# Patient Record
Sex: MALE | Race: WHITE | NOT HISPANIC OR LATINO | Employment: FULL TIME | URBAN - METROPOLITAN AREA
[De-identification: names, ages, dates, MRNs, and addresses within clinical notes are randomized per-mention and may not be internally consistent; named-entity substitution may affect disease eponyms.]

---

## 2022-07-15 ENCOUNTER — OFFICE VISIT (OUTPATIENT)
Dept: FAMILY MEDICINE CLINIC | Facility: CLINIC | Age: 57
End: 2022-07-15
Payer: COMMERCIAL

## 2022-07-15 VITALS
BODY MASS INDEX: 32.51 KG/M2 | DIASTOLIC BLOOD PRESSURE: 84 MMHG | SYSTOLIC BLOOD PRESSURE: 122 MMHG | OXYGEN SATURATION: 98 % | HEART RATE: 108 BPM | HEIGHT: 72 IN | WEIGHT: 240 LBS | TEMPERATURE: 99.4 F

## 2022-07-15 DIAGNOSIS — Z12.11 SCREENING FOR COLORECTAL CANCER: ICD-10-CM

## 2022-07-15 DIAGNOSIS — Z23 ENCOUNTER FOR IMMUNIZATION: ICD-10-CM

## 2022-07-15 DIAGNOSIS — M25.551 RIGHT HIP PAIN: ICD-10-CM

## 2022-07-15 DIAGNOSIS — Z12.5 SCREENING FOR PROSTATE CANCER: ICD-10-CM

## 2022-07-15 DIAGNOSIS — Z12.12 SCREENING FOR COLORECTAL CANCER: ICD-10-CM

## 2022-07-15 DIAGNOSIS — Z11.59 NEED FOR HEPATITIS C SCREENING TEST: ICD-10-CM

## 2022-07-15 DIAGNOSIS — Z11.4 SCREENING FOR HIV (HUMAN IMMUNODEFICIENCY VIRUS): ICD-10-CM

## 2022-07-15 DIAGNOSIS — E78.2 MIXED HYPERLIPIDEMIA: ICD-10-CM

## 2022-07-15 DIAGNOSIS — I83.813 VARICOSE VEINS OF BOTH LOWER EXTREMITIES WITH PAIN: ICD-10-CM

## 2022-07-15 DIAGNOSIS — Z00.00 ANNUAL PHYSICAL EXAM: Primary | ICD-10-CM

## 2022-07-15 PROBLEM — R03.0 ELEVATED BP WITHOUT DIAGNOSIS OF HYPERTENSION: Status: RESOLVED | Noted: 2021-04-06 | Resolved: 2022-07-15

## 2022-07-15 PROBLEM — R03.0 ELEVATED BP WITHOUT DIAGNOSIS OF HYPERTENSION: Status: ACTIVE | Noted: 2021-04-06

## 2022-07-15 PROBLEM — I83.93 ASYMPTOMATIC VARICOSE VEINS OF BOTH LOWER EXTREMITIES: Status: ACTIVE | Noted: 2021-04-06

## 2022-07-15 PROBLEM — L30.9 CHRONIC ECZEMA OF FOOT: Status: ACTIVE | Noted: 2021-04-06

## 2022-07-15 PROCEDURE — 3725F SCREEN DEPRESSION PERFORMED: CPT | Performed by: FAMILY MEDICINE

## 2022-07-15 PROCEDURE — 90715 TDAP VACCINE 7 YRS/> IM: CPT

## 2022-07-15 PROCEDURE — 99386 PREV VISIT NEW AGE 40-64: CPT | Performed by: FAMILY MEDICINE

## 2022-07-15 PROCEDURE — 90471 IMMUNIZATION ADMIN: CPT

## 2022-07-15 NOTE — PROGRESS NOTES
ADULT ANNUAL [facility]    NAME: Blu Faulkner  AGE: 55 y o  SEX: male  : 1965     DATE: 7/15/2022     Assessment and Plan:     Problem List Items Addressed This Visit        Cardiovascular and Mediastinum    Varicose veins of both lower extremities with pain  No phlebitis or associated swelling  For pain associated with his varicose veins recommend compression stockings consistently during waking hours  If no improvement with this, advised him to call and I will place referral to vascular surgeon  Other    Mixed hyperlipidemia    Relevant Orders    Lipid panel    Comprehensive metabolic panel  Reviewed labs done by previous PCP in May of last year  His total cholesterol was elevated at 211  LDL was 137 and HDL was 58  Will repeat lipid panel with his labs and call him with results  Other Visit Diagnoses     Annual physical exam    -  Primary  Discussed diet and exercise  Immunization History   Administered Date(s) Administered    Tdap 07/15/2022     It has bene 10 years since his last tetanus vaccine  adacel ordered today  He has never had colon cancer screen  Is agreeable to colonoscopy and referral placed today  Sees dentist    Screening labs ordered  He is agreeable to Hep C and HIV  Discussed USPSTF recommendation for prostate cancer screen and discussed that not all insurance companies cover  Patient would like to have PSA done         Screening for colorectal cancer        Relevant Orders    Ambulatory referral for Colonoscopy    Screening for prostate cancer        Relevant Orders    PSA, Total Screen    Comprehensive metabolic panel    BMI 81 9-86 2,FEMVV        Encounter for immunization        Relevant Orders    TDAP VACCINE GREATER THAN OR EQUAL TO 8YO IM    Need for hepatitis C screening test        Relevant Orders    Hepatitis C Antibody ([facility])    Screening for HIV (human immunodeficiency virus)        Relevant Orders    HIV 1/2 Antigen/Antibody (4th Generation) w Reflex [facility]    Right hip pain        Relevant Orders    XR hip/pelv 2-3 vws right if performed  Check xray  Will call patient with results  F/u pending xray report  His range of motion is significantly limited and would benefit from PT           Immunizations and preventive care screenings were discussed with patient today  Appropriate education was printed on patient's after visit summary  Counseling:  Alcohol/drug use: discussed moderation in alcohol intake, the recommendations for healthy alcohol use, and avoidance of illicit drug use  Dental Health: discussed importance of regular tooth brushing, flossing, and dental visits  Sexual health: discussed sexually transmitted diseases, partner selection, use of condoms, avoidance of unintended pregnancy, and contraceptive alternatives  Exercise: the importance of regular exercise/physical activity was discussed  Recommend exercise 3-5 times per week for at least 30 minutes  No follow-ups on file  Chief Complaint:     Chief Complaint   Patient presents with   [id number]     Pt is in the office to establish care  Pt's stated concerns include intermittent aches and pains throughout the body  Pt also stated that he has right hip pain  Pt has a hx of varicose veins  Pt is having some degree of pain in his legs (pain level is an 8 ) Pt states that the leg pain is most severe at night  Pt notes swelling in his right ankle  Pt has noticed an increase in leg discomfort over the last two months  History of Present Illness:     Adult Annual Physical   Patient here for a comprehensive physical exam  The patient reports problems - right hip pain, varicose veins        He was previously a patient of [facility]    [address] [place] [address]   [phone]     He was there for visit in April of 2021 and had some labs done through their office  His lipid panel showed elevated cholesterol  bp was elevated at time of his last visit there  He denies any headache, dizziness, chest pain, palpitations or shortness of breath  He has know varicose veins bilateral lower extremities  Really have not bothered him until recently  Now having pain in both legs for last couple of months  Ankles occasionally swell, but no redness, firmness or tenderness over the prominent veins  Has compression stockings at home  Notes that when he was consistently wearing them in the past, it helped  He does a lot of sitting as he is a  and works nights  He is also having some right hip pain  Ongoing for months  He denies any injury  Pain is located both anteriorly in hip (points to groin) and posteriorly  Pain does not radiate and he has no low back pain  Pain is aggravated by walking  The hip pain is starting to affect his gait  Diet and Physical Activity  Diet/Nutrition: admits to snacking at night when he drives  otherwise tries to follow fairly healthy diet      Exercise: no formal exercise  He is active at work  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 and since doing that no longer goes to the gym  Used to go to [employer] [facility] and did cardio and some weight training with a   Depression Screening  PHQ-2/9 Depression Screening    Little interest or pleasure in doing things: 1 - several days  Feeling down, depressed, or hopeless: 0 - not at all  PHQ-2 Score: 1  PHQ-2 Interpretation: Negative depression screen       General Health  Sleep: sleeps 5 hours      Hearing: normal - bilateral   Vision: wears glasses  Just wears readers  Dental: regular dental visits          Health  Symptoms include: none     Review of Systems:     Review of Systems   Past Medical History:     Past Medical History:   Diagnosis Date    Asymptomatic varicose veins of bilateral lower extremities     Varicose veins of legs 2019    bilateral      Past Surgical History:     Past Surgical History:   Procedure Laterality Date    MULTIPLE TOOTH EXTRACTIONS Bilateral 2008      Family History:     Family History   Problem Relation Age of Onset    Aneurysm Mother     Heart disease Father 76        Had 11 coronary bypasses    Stroke Father     Kidney disease Paternal Grandfather 76        Had dialysis also      Social History:     Social History     Socioeconomic History    Marital status: /Civil Union     Spouse name: None    Number of children: None    Years of education: None    Highest education level: None   Occupational History    None   Tobacco Use    Smoking status: Never Smoker    Smokeless tobacco: Never Used   Vaping Use    Vaping Use: Never used   Substance and Sexual Activity    Alcohol use: Yes     Alcohol/week: 1 0 standard drink     Types: 1 Glasses of wine per week     Comment: Less than one glass of wine per week; sometimes pt goes weeks without drinking   Drug use: Never    Sexual activity: Yes     Partners: Female   Other Topics Concern    None   Social History Narrative    Was in [employer] for 4 years ([id number])    Currently works nights driving truck  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        One child     Social Determinants of Health     Financial Resource Strain: Not on file   Food Insecurity: Not on file   Transportation Needs: Not on file   Physical Activity: Not on file   Stress: Not on file   Social Connections: Not on file   Intimate Partner Violence: Not on file   Housing Stability: Not on file      Current Medications:     No current outpatient medications on file  No current facility-administered medications for this visit        Allergies:     No Known Allergies   Physical Exam:     /84 (BP Location: Left arm, Patient Position: Sitting, Cuff Size: Large)   Pulse (!) 108   Temp 99 4 °F (37 4 °C) (Tympanic)   Ht 5' 11 5" (1 816 m)   Wt 109 kg (240 lb)   SpO2 98%   BMI 33 01 kg/m²     Physical Exam  Vitals and nursing note reviewed  Constitutional:       General: He is not in acute distress  Appearance: Normal appearance  He is obese  He is not ill-appearing, toxic-appearing or diaphoretic  Comments: Antalgic gait   HENT:      Head: Normocephalic and atraumatic  Right Ear: Tympanic membrane normal       Left Ear: Tympanic membrane normal       Nose: Nose normal       Mouth/Throat:      Mouth: Mucous membranes are moist       Pharynx: No oropharyngeal exudate or posterior oropharyngeal erythema  Eyes:      Extraocular Movements: Extraocular movements intact  Conjunctiva/sclera: Conjunctivae normal       Pupils: Pupils are equal, round, and reactive to light  Neck:      Comments: No thyromegaly  Cardiovascular:      Rate and Rhythm: Normal rate and regular rhythm  Pulses: Normal pulses  Heart sounds: No murmur heard  Comments: + varicosities bilateral lower legs  Pulmonary:      Effort: Pulmonary effort is normal       Breath sounds: Normal breath sounds  No wheezing  Abdominal:      General: Abdomen is flat  Bowel sounds are normal  There is no distension  Palpations: Abdomen is soft  There is no mass  Tenderness: There is no abdominal tenderness  Musculoskeletal:      Cervical back: Normal range of motion and neck supple  Right lower leg: No edema  Left lower leg: No edema  Comments: Right hip with no tenderness on palpation  Active flexion to 45 degrees  Passive flexion to 50 degrees  Abduction to 40 degrees  Decreased external and internal rotation   Lymphadenopathy:      Cervical: No cervical adenopathy  Neurological:      General: No focal deficit present  Mental Status: He is alert and oriented to person, place, and time  Psychiatric:         Mood and Affect: Mood normal           Josh Duran DO  [facility]  BMI Counseling:  Body mass index is 33 01 kg/m²  The BMI is above normal  Nutrition recommendations include reducing portion sizes and consuming healthier snacks  Exercise recommendations include exercising 3-5 times per week

## 2022-07-15 NOTE — PATIENT INSTRUCTIONS
Wellness Visit for Adults   AMBULATORY CARE:   A wellness visit  is when you see your healthcare provider to get screened for health problems  Your healthcare provider will also give you advice on how to stay healthy  Write down your questions so you remember to ask them  Ask your healthcare provider how often you should have a wellness visit  What happens at a wellness visit:  Your healthcare provider will ask about your health, and your family history of health problems  This includes high blood pressure, heart disease, and cancer  He or she will ask if you have symptoms that concern you, if you smoke, and about your mood  You may also be asked about your intake of medicines, supplements, food, and alcohol  Any of the following may be done:  · Your weight  will be checked  Your height may also be checked so your body mass index (BMI) can be calculated  Your BMI shows if you are at a healthy weight  · Your blood pressure  and heart rate will be checked  Your temperature may also be checked  · Blood and urine tests  may be done  Blood tests may be done to check your cholesterol levels  Abnormal cholesterol levels increase your risk for heart disease and stroke  You may also need a blood or urine test to check for diabetes if you are at increased risk  Urine tests may be done to look for signs of an infection or kidney disease  · A physical exam  includes checking your heartbeat and lungs with a stethoscope  Your healthcare provider may also check your skin to look for sun damage  · Screening tests  may be recommended  A screening test is done to check for diseases that may not cause symptoms  The screening tests you may need depend on your age, gender, family history, and lifestyle habits  For example, colorectal screening may be recommended if you are 48years old or older  Screening tests you need if you are a woman:   · A Pap smear  is used to screen for cervical cancer   Pap smears are usually done every 3 to 5 years depending on your age  You may need them more often if you have had abnormal Pap smear test results in the past  Ask your healthcare provider how often you should have a Pap smear  · A mammogram  is an x-ray of your breasts to screen for breast cancer  Experts recommend mammograms every 2 years starting at age 48 years  You may need a mammogram at age 52 years or younger if you have an increased risk for breast cancer  Talk to your healthcare provider about when you should start having mammograms and how often you need them  Vaccines you may need:   · Get an influenza vaccine  every year  The influenza vaccine protects you from the flu  Several types of viruses cause the flu  The viruses change over time, so new vaccines are made each year  · Get a tetanus-diphtheria (Td) booster vaccine  every 10 years  This vaccine protects you against tetanus and diphtheria  Tetanus is a severe infection that may cause painful muscle spasms and lockjaw  Diphtheria is a severe bacterial infection that causes a thick covering in the back of your mouth and throat  · Get a human papillomavirus (HPV) vaccine  if you are female and aged 23 to 32 or male 23 to 24 and never received it  This vaccine protects you from HPV infection  HPV is the most common infection spread by sexual contact  HPV may also cause vaginal, penile, and anal cancers  · Get a pneumococcal vaccine  if you are aged 72 years or older  The pneumococcal vaccine is an injection given to protect you from pneumococcal disease  Pneumococcal disease is an infection caused by pneumococcal bacteria  The infection may cause pneumonia, meningitis, or an ear infection  · Get a shingles vaccine  if you are 60 or older, even if you have had shingles before  The shingles vaccine is an injection to protect you from the varicella-zoster virus  This is the same virus that causes chickenpox   Shingles is a painful rash that develops in people who had chickenpox or have been exposed to the virus  How to eat healthy:  My Plate is a model for planning healthy meals  It shows the types and amounts of foods that should go on your plate  Fruits and vegetables make up about half of your plate, and grains and protein make up the other half  A serving of dairy is included on the side of your plate  The amount of calories and serving sizes you need depends on your age, gender, weight, and height  Examples of healthy foods are listed below:  · Eat a variety of vegetables  such as dark green, red, and orange vegetables  You can also include canned vegetables low in sodium (salt) and frozen vegetables without added butter or sauces  · Eat a variety of fresh fruits , canned fruit in 100% juice, frozen fruit, and dried fruit  · Include whole grains  At least half of the grains you eat should be whole grains  Examples include whole-wheat bread, wheat pasta, brown rice, and whole-grain cereals such as oatmeal     · Eat a variety of protein foods such as seafood (fish and shellfish), lean meat, and poultry without skin (turkey and chicken)  Examples of lean meats include pork leg, shoulder, or tenderloin, and beef round, sirloin, tenderloin, and extra lean ground beef  Other protein foods include eggs and egg substitutes, beans, peas, soy products, nuts, and seeds  · Choose low-fat dairy products such as skim or 1% milk or low-fat yogurt, cheese, and cottage cheese  · Limit unhealthy fats  such as butter, hard margarine, and shortening  Exercise:  Exercise at least 30 minutes per day on most days of the week  Some examples of exercise include walking, biking, dancing, and swimming  You can also fit in more physical activity by taking the stairs instead of the elevator or parking farther away from stores  Include muscle strengthening activities 2 days each week  Regular exercise provides many health benefits   It helps you manage your weight, and decreases your risk for type 2 diabetes, heart disease, stroke, and high blood pressure  Exercise can also help improve your mood  Ask your healthcare provider about the best exercise plan for you  General health and safety guidelines:   · Do not smoke  Nicotine and other chemicals in cigarettes and cigars can cause lung damage  Ask your healthcare provider for information if you currently smoke and need help to quit  E-cigarettes or smokeless tobacco still contain nicotine  Talk to your healthcare provider before you use these products  · Limit alcohol  A drink of alcohol is 12 ounces of beer, 5 ounces of wine, or 1½ ounces of liquor  · Lose weight, if needed  Being overweight increases your risk of certain health conditions  These include heart disease, high blood pressure, type 2 diabetes, and certain types of cancer  · Protect your skin  Do not sunbathe or use tanning beds  Use sunscreen with a SPF 15 or higher  Apply sunscreen at least 15 minutes before you go outside  Reapply sunscreen every 2 hours  Wear protective clothing, hats, and sunglasses when you are outside  · Drive safely  Always wear your seatbelt  Make sure everyone in your car wears a seatbelt  A seatbelt can save your life if you are in an accident  Do not use your cell phone when you are driving  This could distract you and cause an accident  Pull over if you need to make a call or send a text message  · Practice safe sex  Use latex condoms if are sexually active and have more than one partner  Your healthcare provider may recommend screening tests for sexually transmitted infections (STIs)  · Wear helmets, lifejackets, and protective gear  Always wear a helmet when you ride a bike or motorcycle, go skiing, or play sports that could cause a head injury  Wear protective equipment when you play sports  Wear a lifejacket when you are on a boat or doing water sports      © Copyright [employer] 2022 Information is for End User's use only and may not be sold, redistributed or otherwise used for commercial purposes  All illustrations and images included in CareNotes® are the copyrighted property of A D A M , Inc  or Angel Villavicencio  The above information is an  only  It is not intended as medical advice for individual conditions or treatments  Talk to your doctor, nurse or pharmacist before following any medical regimen to see if it is safe and effective for you  Obesity   AMBULATORY CARE:   Obesity  means your body mass index (BMI) is greater than 30  Your healthcare provider will use your height and weight to measure your BMI  The risks of obesity include  many health problems, including injuries or physical disability  · Diabetes (high blood sugar level)    · High blood pressure or high cholesterol    · Heart disease    · Stroke    · Gallbladder or liver disease    · Cancer of the colon, breast, prostate, liver, or kidney    · Sleep apnea    · Arthritis or gout    Screening  is done to check for health conditions before you have signs or symptoms  If you are 28to 79years old, your blood sugar level may be checked every 3 years for signs of prediabetes or diabetes  Your healthcare provider will check your blood pressure at each visit  High blood pressure can lead to a stroke or other problems  Your provider may check for signs of heart disease, cancer, or other health problems  Seek care immediately if:   · You have a severe headache, confusion, or difficulty speaking  · You have weakness on one side of your body  · You have chest pain, sweating, or shortness of breath  Call your doctor if:   · You have symptoms of gallbladder or liver disease, such as pain in your upper abdomen  · You have knee or hip pain and discomfort while walking  · You have symptoms of diabetes, such as intense hunger and thirst, and frequent urination      · You have symptoms of sleep apnea, such as snoring or daytime sleepiness  · You have questions or concerns about your condition or care  Treatment for obesity  focuses on helping you lose weight to improve your health  Even a small decrease in BMI can reduce the risk for many health problems  Your healthcare provider will help you set a weight-loss goal   · Lifestyle changes  are the first step in treating obesity  These include making healthy food choices and getting regular physical activity  Your healthcare provider may suggest a weight-loss program that involves coaching, education, and therapy  · Medicine  may help you lose weight when it is used with a healthy foods and physical activity  · Surgery  can help you lose weight if you are very obese and have other health problems  There are several types of weight-loss surgery  Ask your healthcare provider for more information  Tips for safe weight loss:   · Set small, realistic goals  An example of a small goal is to walk for 20 minutes 5 days a week  Anther goal is to lose 5% of your body weight  · Tell friends, family members, and coworkers about your goals  and ask for their support  Ask a friend to lose weight with you, or join a weight-loss support group  · Identify foods or triggers that may cause you to overeat , and find ways to avoid them  Remove tempting high-calorie foods from your home and workplace  Place a bowl of fresh fruit on your kitchen counter  If stress causes you to eat, then find other ways to cope with stress  A counselor or therapist may be able to help you  · Keep a diary to track what you eat and drink  Also write down how many minutes of physical activity you do each day  Weigh yourself once a week and record it in your diary  Eating changes: You will need to eat 500 to 1,000 fewer calories each day than you currently eat to lose 1 to 2 pounds a week  The following changes will help you cut calories:  · Eat smaller portions    Use small plates, no larger than 9 inches in diameter  Fill your plate half full of fruits and vegetables  Measure your food using measuring cups until you know what a serving size looks like  · Eat 3 meals and 1 or 2 snacks each day  Plan your meals in advance  Meir Arnett and eat at home most of the time  Eat slowly  Do not skip meals  Skipping meals can lead to overeating later in the day  This can make it harder for you to lose weight  Talk with a dietitian to help you make a meal plan and schedule that is right for you  · Eat fruits and vegetables at every meal   They are low in calories and high in fiber, which makes you feel full  Do not add butter, margarine, or cream sauce to vegetables  Use herbs to season steamed vegetables  · Eat less fat and fewer fried foods  Eat more baked or grilled chicken and fish  These protein sources are lower in calories and fat than red meat  Limit fast food  Dress your salads with olive oil and vinegar instead of bottled dressing  · Limit the amount of sugar you eat  Do not drink sugary beverages  Limit alcohol  Activity changes:  Physical activity is good for your body in many ways  It helps you burn calories and build strong muscles  It decreases stress and depression, and improves your mood  It can also help you sleep better  Talk to your healthcare provider before you begin an exercise program   · Exercise for at least 30 minutes 5 days a week  Start slowly  Set aside time each day for physical activity that you enjoy and that is convenient for you  It is best to do both weight training and an activity that increases your heart rate, such as walking, bicycling, or swimming  · Find ways to be more active  Do yard work and housecleaning  Walk up the stairs instead of using elevators  Spend your leisure time going to events that require walking, such as outdoor festivals or fairs  This extra physical activity can help you lose weight and keep it off         Follow up with your doctor as directed: You may need to meet with a dietitian  Write down your questions so you remember to ask them during your visits  © Copyright [employer] 2022 Information is for End User's use only and may not be sold, redistributed or otherwise used for commercial purposes  All illustrations and images included in CareNotes® are the copyrighted property of A D A M , Inc  or [facility] Willian Macdonald   The above information is an  only  It is not intended as medical advice for individual conditions or treatments  Talk to your doctor, nurse or pharmacist before following any medical regimen to see if it is safe and effective for you

## 2022-07-17 LAB
ALBUMIN SERPL-MCNC: 4.2 G/DL (ref 3.6–5.1)
ALBUMIN/GLOB SERPL: 1.3 (CALC) (ref 1–2.5)
ALP SERPL-CCNC: 79 U/L (ref 35–144)
ALT SERPL-CCNC: 24 U/L (ref 9–46)
AST SERPL-CCNC: 21 U/L (ref 10–35)
BILIRUB SERPL-MCNC: 0.9 MG/DL (ref 0.2–1.2)
BUN SERPL-MCNC: 20 MG/DL (ref 7–25)
BUN/CREAT SERPL: NORMAL (CALC) (ref 6–22)
CALCIUM SERPL-MCNC: 9.2 MG/DL (ref 8.6–10.3)
CHLORIDE SERPL-SCNC: 103 MMOL/L (ref 98–110)
CHOLEST SERPL-MCNC: 191 MG/DL
CHOLEST/HDLC SERPL: 2.8 (CALC)
CO2 SERPL-SCNC: 28 MMOL/L (ref 20–32)
CREAT SERPL-MCNC: 0.76 MG/DL (ref 0.7–1.3)
GFR/BSA.PRED SERPLBLD CYS-BASED-ARV: 106 ML/MIN/1.73M2
GLOBULIN SER CALC-MCNC: 3.2 G/DL (CALC) (ref 1.9–3.7)
GLUCOSE SERPL-MCNC: 97 MG/DL (ref 65–99)
HCV AB S/CO SERPL IA: 0.12
HCV AB SERPL QL IA: NORMAL
HDLC SERPL-MCNC: 68 MG/DL
HIV 1+2 AB+HIV1 P24 AG SERPL QL IA: NORMAL
LDLC SERPL CALC-MCNC: 109 MG/DL (CALC)
NONHDLC SERPL-MCNC: 123 MG/DL (CALC)
POTASSIUM SERPL-SCNC: 4.2 MMOL/L (ref 3.5–5.3)
PROT SERPL-MCNC: 7.4 G/DL (ref 6.1–8.1)
PSA SERPL-MCNC: 0.82 NG/ML
SODIUM SERPL-SCNC: 139 MMOL/L (ref 135–146)
TRIGL SERPL-MCNC: 53 MG/DL

## 2022-07-18 ENCOUNTER — TELEPHONE (OUTPATIENT)
Dept: FAMILY MEDICINE CLINIC | Facility: CLINIC | Age: 57
End: 2022-07-18

## 2022-07-18 ENCOUNTER — APPOINTMENT (OUTPATIENT)
Dept: RADIOLOGY | Facility: CLINIC | Age: 57
End: 2022-07-18
Payer: COMMERCIAL

## 2022-07-18 DIAGNOSIS — M25.551 RIGHT HIP PAIN: ICD-10-CM

## 2022-07-18 PROCEDURE — 73502 X-RAY EXAM HIP UNI 2-3 VIEWS: CPT

## 2022-07-18 NOTE — TELEPHONE ENCOUNTER
1st attempt to contact pt -    I left a VM advising pt to call the office for his recent lab results

## 2022-07-18 NOTE — TELEPHONE ENCOUNTER
----- Message from Nayeli Delgado DO sent at 7/18/2022 12:06 PM EDT -----  Please call patient to let him know that his labs looked great  His hep c test was negative  HIV screen negative  His blood sugar, kidney function, liver tests and electrolytes were all okay  His PSA was normal    Total cholesterol good at 191

## 2022-07-19 ENCOUNTER — TELEPHONE (OUTPATIENT)
Dept: FAMILY MEDICINE CLINIC | Facility: CLINIC | Age: 57
End: 2022-07-19

## 2022-07-19 NOTE — TELEPHONE ENCOUNTER
I left a VM advising pt that his past medical records were not found at [place], as per Carey Blackburn  From [place] [place]

## 2022-07-19 NOTE — TELEPHONE ENCOUNTER
Claudette Rios @ [address] ([phone] called to verify [facility]'s address  Claudette Rios also verified another mutual patient's name and  (Sharonlos Vang), and confirmed that no records could be found in their system  Claudette Rios stated that all medical records before  are inaccessible

## 2022-07-20 NOTE — TELEPHONE ENCOUNTER
Pt called in with corrected contact information for Prior PCP  New Medical Information Release Form faxed to [facility]  Yvonne, [address] [facility] office @ [phone]

## 2022-07-25 ENCOUNTER — TELEPHONE (OUTPATIENT)
Dept: FAMILY MEDICINE CLINIC | Facility: CLINIC | Age: 57
End: 2022-07-25

## 2022-07-25 DIAGNOSIS — M16.11 PRIMARY OSTEOARTHRITIS OF RIGHT HIP: Primary | ICD-10-CM

## 2022-07-25 NOTE — TELEPHONE ENCOUNTER
Noted  The PT referral was placed at time of his appt so he just needs to call for appointment if they have not reached out to schedule yet     I placed referral for orthopedic eval

## 2022-07-25 NOTE — TELEPHONE ENCOUNTER
Pt called in requesting his XR results  Pt stated that he can see his XR results via [employer] but has not heard from our office regarding their interpretation  Pt is advised that each test must first be resulted and interpreted by the ordering physician, and then the office staff will call pt with the result note  Pt confirmed understanding  Pt awaits result note

## 2022-07-25 NOTE — TELEPHONE ENCOUNTER
----- Message from Gema Arriola, DO sent at 7/25/2022 12:29 PM EDT -----  Can let patient know that his xray shows arthritis in both his low back and right hip  Recommend he start PT as we had discussed at his office visit  May also benefit from seeing ortho for possible injection  Let me know if he would like to see orthopedist as well as starting PT     Also, I see that patient called in requesting his xray results  Please let him know when you call patient that result was sent to me by radiologist on 7/22 (Friday) after we left office for the day so I am not seeing report until coming into office this morning

## 2022-08-08 ENCOUNTER — OFFICE VISIT (OUTPATIENT)
Dept: OBGYN CLINIC | Facility: CLINIC | Age: 57
End: 2022-08-08
Payer: COMMERCIAL

## 2022-08-08 VITALS
WEIGHT: 242 LBS | SYSTOLIC BLOOD PRESSURE: 130 MMHG | HEIGHT: 71 IN | DIASTOLIC BLOOD PRESSURE: 80 MMHG | BODY MASS INDEX: 33.88 KG/M2

## 2022-08-08 DIAGNOSIS — M16.11 PRIMARY OSTEOARTHRITIS OF RIGHT HIP: ICD-10-CM

## 2022-08-08 DIAGNOSIS — M25.551 RIGHT HIP PAIN: Primary | ICD-10-CM

## 2022-08-08 PROCEDURE — 99203 OFFICE O/P NEW LOW 30 MIN: CPT | Performed by: PHYSICIAN ASSISTANT

## 2022-08-08 NOTE — PROGRESS NOTES
Orthopaedic Surgery - Office Note  Isreal Vaca (55 y o  male)   : 1965   MRN: [id number]  Encounter Date: 2022    Chief Complaint   Patient presents with    Right Hip - Pain         Assessment/Plan  Diagnoses and all orders for this visit:    Right hip pain  -     Ambulatory Referral to Physical Therapy; Future  -     US msk guidance; Future    Primary osteoarthritis of right hip  -     Ambulatory Referral to Orthopedic Surgery  -     Ambulatory Referral to Physical Therapy; Future  -     US msk guidance; Future    I reviewed with the patient he has right hip osteoarthritis on x-ray  This typically presents as groin pain which she has minimal discomfort  His lateral hip pain is more consistent with a trochanteric bursitis or possible back origin  Treatment options were discussed  Since the Voltaren gel is helping the troch bursal symptoms he will continue with this medication  Would recommend a diagnostic and therapeutic intra-articular right hip ultrasound guided hip injection  We reviewed he will monitor symptoms about how he feels after the injection and follow up with a total hip surgeon after the injection to see how his symptoms felt after the injection  Treatment option will be based on the results  Patient will be started physical therapy for 1-2 visits care towards a hip strengthening program as limited gains will be made in range of motion  Return 6 week after the injection with Dr Begum  History of Present Illness  Regine Dunaway is a new patient with right hip pain  He reports that over the past several months he has been having increasing lateral hip pain that is worse when rising from a sitting position to a standing position  He denies any recent trauma  He localizes the pain to the greater troch region  He reports Voltaren gel has helped his symptoms  He reports an occasional groin pain  He denies pain radiating down the leg    He reports the compression stockings have resolved his varicose vein symptoms  No calf pain or paresthesias are reported  He denies any low back pain  Patient reports 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 and is very active  He is also a volunteer   Review of Systems  Pertinent items are noted in HPI  All other systems were reviewed and are negative  Physical Exam  /80   Ht 5' 11" (1 803 m)   Wt 110 kg (242 lb)   BMI 33 75 kg/m²   Cons: Appears well  No apparent distress  Psych: Alert  Oriented x3  Mood and affect normal   Eyes: PERRLA, EOMI  Resp: Normal effort  No audible wheezing or stridor  CV: Palpable pulse  No discernable arrhythmia  Lymph:  No palpable cervical, axillary, or inguinal lymphadenopathy  Skin: Warm  No palpable masses  No visible lesions  Neuro: Normal muscle tone  Normal and symmetric DTR's  Patient's right hip is nontender to palpation over the troch bursa  He has a negative straight leg raise  He has marked limited range of motion to internal rotation external rotation forward flexion and extension of the right hip  He has minimal pain with forced internal rotation  He has a negative drawer sign for fracture  He is gait is heel-to-toe  Hip flexion and extension strength is 4/5  Adduction and abduction strength is at 4-out of 5  He has no calf tenderness and a negative Homans          Studies Reviewed    Study Result    Narrative & Impression   RIGHT HIP     INDICATION:   M25 551: Pain in right hip      COMPARISON:  None     VIEWS:  XR HIP/PELV 2-3 VWS RIGHT W PELVIS IF PERFORMED         FINDINGS:     There is no acute fracture or dislocation      Moderate space narrowing with sclerosis and large osteophytes  Mild-to-moderate left hip degenerative change  No lytic or blastic osseous lesion      Soft tissues are unremarkable      Degenerative changes visualized lower lumbar spine      IMPRESSION:  Moderate right hip degenerative change    No acute osseous abnormality            Workstation performed: [id number]      X-ray images as well as reports were reviewed by myself in the office today  He has severe right hip osteoarthritis  PCP notes from 07/15/2022 reviewed by myself in the office today  Procedures  No procedures today  Medical, Surgical, Family, and Social History  The patient's medical history, family history, and social history, were reviewed and updated as appropriate  Past Medical History:   Diagnosis Date    Asymptomatic varicose veins of bilateral lower extremities     Varicose veins of legs 2019    bilateral       Past Surgical History:   Procedure Laterality Date    MULTIPLE TOOTH EXTRACTIONS Bilateral 2008       Family History   Problem Relation Age of Onset    Aneurysm Mother     Heart disease Father 76        Had 11 coronary bypasses    Stroke Father     Kidney disease Paternal Grandfather 76        Had dialysis also       Social History     Occupational History    Not on file   Tobacco Use    Smoking status: Never Smoker    Smokeless tobacco: Never Used   Vaping Use    Vaping Use: Never used   Substance and Sexual Activity    Alcohol use: Yes     Alcohol/week: 1 0 standard drink     Types: 1 Glasses of wine per week     Comment: Less than one glass of wine per week; sometimes pt goes weeks without drinking   Drug use: Never    Sexual activity: Yes     Partners: Female       No Known Allergies    No current outpatient medications on file        Emmett Campos PA-C

## 2022-08-29 ENCOUNTER — CONSULT (OUTPATIENT)
Dept: PAIN MEDICINE | Facility: MEDICAL CENTER | Age: 57
End: 2022-08-29
Payer: COMMERCIAL

## 2022-08-29 VITALS
DIASTOLIC BLOOD PRESSURE: 96 MMHG | HEART RATE: 91 BPM | SYSTOLIC BLOOD PRESSURE: 138 MMHG | OXYGEN SATURATION: 96 % | WEIGHT: 243 LBS | HEIGHT: 71 IN | BODY MASS INDEX: 34.02 KG/M2

## 2022-08-29 DIAGNOSIS — M16.10 HIP ARTHRITIS: Primary | ICD-10-CM

## 2022-08-29 PROCEDURE — 99244 OFF/OP CNSLTJ NEW/EST MOD 40: CPT | Performed by: PHYSICAL MEDICINE & REHABILITATION

## 2022-08-29 NOTE — PROGRESS NOTES
Assessment  1  Hip arthritis        Plan  1  At this time we will schedule the patient for a right hip joint injection under fluoroscopic guidance  This will be to expedite the procedure as it does not require ultrasound use  This can be either here in [facility] or of the [facility] office at the patient's convenience  Complete risks and benefits including bleeding, infection, tissue reaction, allergic reaction were discussed  Verbal consent obtained  My impressions and treatment recommendations were discussed in detail with the patient who verbalized understanding and had no further questions  Discharge instructions were provided  I personally saw and examined the patient and I agree with the above discussed plan of care  Orders Placed This Encounter   Procedures    [place] spine and pain procedure     Please schedule ASAP, here in [address] [place] [address] or [place] (lives in [facility] and was referred in for direct injection)  Standing Status:   Future     Standing Expiration Date:   8/29/2023     Order Specific Question:   Reason for Exam:     Answer:   (R) hip joint injection     Order Specific Question:   Anticoagulant hold needed? Answer:   no     No orders of the defined types were placed in this encounter  History of Present Illness    Aijt Lima is a 55 y o  male seen in consultation regarding right hip pain  Patient has been experiencing symptoms over the past 6 months and noticing decreased range of motion pain into the groin  He rates this as mild to moderate intensity currently a 6/10 which is intermittent in nature without any typical pattern  The pain is described as shooting and sharp  He does describe some lower extremity weakness  Currently not using an assistive device for ambulation  Aggravating factors include lying down sitting and walking  Alleviating factors are unknown        Diagnostic studies include x-rays of the hip joint demonstrating significant osteoarthritis in the right hip  He has not tried any conservative care at this point  Social history negative for tobacco marijuana use positive for occasional alcohol consumption  Currently using ibuprofen for pain relief but seeking further improvement  I have personally reviewed and/or updated the patient's past medical history, past surgical history, family history, social history, current medications, allergies, and vital signs today  Review of Systems   Constitutional: Negative for fever and unexpected weight change  HENT: Negative for trouble swallowing  Eyes: Negative for visual disturbance  Respiratory: Negative for shortness of breath and wheezing  Cardiovascular: Positive for leg swelling  Negative for chest pain and palpitations  Gastrointestinal: Negative for constipation, diarrhea, nausea and vomiting  Endocrine: Negative for cold intolerance, heat intolerance and polydipsia  Genitourinary: Negative for difficulty urinating and frequency  Musculoskeletal: Positive for gait problem and myalgias  Negative for arthralgias and joint swelling  Skin: Negative for rash  Neurological: Negative for dizziness, seizures, syncope, weakness and headaches  Hematological: Does not bruise/bleed easily  Psychiatric/Behavioral: Negative for dysphoric mood  All other systems reviewed and are negative        Patient Active Problem List   Diagnosis    Varicose veins of both lower extremities with pain    Chronic eczema of foot    Mixed hyperlipidemia       Past Medical History:   Diagnosis Date    Asymptomatic varicose veins of bilateral lower extremities     Chronic pain of right hip     Varicose veins of legs 2019    bilateral       Past Surgical History:   Procedure Laterality Date    MULTIPLE TOOTH EXTRACTIONS Bilateral 2008       Family History   Problem Relation Age of Onset    Aneurysm Mother     Heart disease Father 76        Had 5 coronary bypasses    Stroke Father     Kidney disease Paternal Grandfather 76        Had dialysis also       Social History     Occupational History    Not on file   Tobacco Use    Smoking status: Never Smoker    Smokeless tobacco: Never Used   Vaping Use    Vaping Use: Never used   Substance and Sexual Activity    Alcohol use: Yes     Alcohol/week: 1 0 standard drink     Types: 1 Glasses of wine per week     Comment: Less than one glass of wine per week; sometimes pt goes weeks without drinking   Drug use: Never    Sexual activity: Yes     Partners: Female       No current outpatient medications on file prior to visit  No current facility-administered medications on file prior to visit  No Known Allergies    Physical Exam    /96   Pulse 91   Ht 5' 11" (1 803 m)   Wt 110 kg (243 lb)   SpO2 96%   BMI 33 89 kg/m²     Constitutional: normal, well developed, well nourished, alert, in no distress and non-toxic and no overt pain behavior  Eyes: anicteric  HEENT: grossly intact  Neck:  symmetric, trachea midline and no masses   Pulmonary:even and unlabored  Cardiovascular:No edema or pitting edema present  Skin:Normal without rashes or lesions and well hydrated  Psychiatric:Mood and affect appropriate  Neurologic:Cranial Nerves II-XII grossly intact, bilateral lower extremity strength is normal, bilateral lower extremity muscle stretch reflexes are physiologic and symmetric at the knees and ankles, negative straight leg raise test from a seated position  Musculoskeletal:normal, except for significant limitation to passive internal and external hip range of motion on the right side with mild reproduction of pain    Imaging    XR hip/pelv 2-3 vws right if performed: Result Notes     Nick Mars DO   7/25/2022 12:29 PM EDT         Can let patient know that his xray shows arthritis in both his low back and right hip  Recommend he start PT as we had discussed at his office visit   May also benefit from seeing ortho for possible injection  Let me know if he would like to see orthopedist as well as starting PT      Also, I see that patient called in requesting his xray results  Please let him know when you call patient that result was sent to me by radiologist on 7/22 (Friday) after we left office for the day so I am not seeing report until coming into office this morning                Study Result    Narrative & Impression   RIGHT HIP     INDICATION:   M25 551: Pain in right hip      COMPARISON:  None     VIEWS:  XR HIP/PELV 2-3 VWS RIGHT W PELVIS IF PERFORMED         FINDINGS:     There is no acute fracture or dislocation      Moderate space narrowing with sclerosis and large osteophytes  Mild-to-moderate left hip degenerative change  No lytic or blastic osseous lesion      Soft tissues are unremarkable      Degenerative changes visualized lower lumbar spine      IMPRESSION:  Moderate right hip degenerative change    No acute osseous abnormality            Workstation performed: [id number]

## 2022-08-29 NOTE — PATIENT INSTRUCTIONS
Arthritis   WHAT YOU NEED TO KNOW:   Arthritis is pain or disease in one or more joints  There are many types of arthritis  Types such as rheumatoid arthritis cause inflammation in the joints  Other types wear away the cartilage between joints, such as osteoarthritis  This makes the bones of the joint rub together when you move the joint  An infection from bacteria, a virus, or a fungus can also cause arthritis  Your symptoms may be constant, or symptoms may come and go  Arthritis often gets worse over time and can cause permanent joint damage  DISCHARGE INSTRUCTIONS:   Call your doctor or rheumatologist if:   You have a fever and severe joint pain or swelling  You cannot move the affected joint  You have severe joint pain you cannot tolerate  You have a new or worsening rash  Your pain or swelling does not get better with treatment  You have questions or concerns about your condition or care  Medicines:   Acetaminophen  decreases pain and fever  It is available without a doctor's order  Ask how much to take and how often to take it  Follow directions  Read the labels of all other medicines you are using to see if they also contain acetaminophen, or ask your doctor or pharmacist  Acetaminophen can cause liver damage if not taken correctly  Do not use more than 4 grams (4,000 milligrams) total of acetaminophen in one day  NSAIDs , such as ibuprofen, help decrease swelling, pain, and fever  This medicine is available with or without a doctor's order  NSAIDs can cause stomach bleeding or kidney problems in certain people  If you take blood thinner medicine, always ask your healthcare provider if NSAIDs are safe for you  Always read the medicine label and follow directions  Steroids  reduce swelling and pain  Prescription pain medicine  may be given  Ask your healthcare provider how to take this medicine safely  Some prescription pain medicines contain acetaminophen   Do not take other medicines that contain acetaminophen without talking to your healthcare provider  Too much acetaminophen may cause liver damage  Prescription pain medicine may cause constipation  Ask your healthcare provider how to prevent or treat constipation  Take your medicine as directed  Contact your healthcare provider if you think your medicine is not helping or if you have side effects  Tell him of her if you are allergic to any medicine  Keep a list of the medicines, vitamins, and herbs you take  Include the amounts, and when and why you take them  Bring the list or the pill bottles to follow-up visits  Carry your medicine list with you in case of an emergency  Manage your symptoms:   Rest your painful joint so it can heal   Your healthcare provider may recommend crutches or a walker if the affected joint is in a leg  Apply ice or heat to the joint  Both can help decrease swelling and pain  Ice may also help prevent tissue damage  Use an ice pack, or put crushed ice in a plastic bag  Cover it with a towel and place it on your joint for 15 to 20 minutes every hour or as directed  You can apply heat for 20 minutes every 2 hours  Heat treatment includes hot packs or heat lamps  Elevate your joint  Elevation helps reduce swelling and pain  Raise your joint above the level of your heart as often as you can  Prop your painful joint on pillows to keep it above your heart comfortably  Manage arthritis:   Talk to your healthcare providers about your arthritis medicines  Some medicines may only be needed when you have arthritis pain  You may need to take other medicines every day to prevent arthritis from getting worse  Your healthcare providers will help you understand all your medicines and when to take them  It is important to take the medicines as directed, even if you start to feel better  You can continue to have joint damage and inflammation even if you do not feel it      Eat a variety of healthy foods   Healthy foods include fruits, vegetables, whole-grain breads, low-fat dairy products, beans, lean meats, and fish  Ask if you need to be on a special diet  A diet rich in calcium and vitamin D may decrease your risk of osteoporosis  Foods high in calcium include milk, cheese, broccoli, and tofu  Vitamin D may be found in meat, fish, fortified milk, cereal and bread  Ask if you need calcium or vitamin D supplements  Go to physical or occupational therapy as directed  A physical therapist can teach you exercises to improve flexibility and range of motion  You may also be shown non-weight-bearing exercises that are safe for your joints, such as swimming  Exercise can help keep your joints flexible and reduce pain  An occupational therapist can help you learn to do your daily activities when your joints are stiff or sore  Maintain a healthy weight  Extra weight puts increased pressure on your joints  Ask your healthcare provider what you should weigh  If you need to lose weight, he or she can help you create a weight loss program  Weight loss can help reduce pain and increase your ability to do your activities  Wear flat or low-heeled shoes  This will help decrease pain and reduce pressure on your ankle, knee, and hip joints  Do not smoke  Nicotine and other chemicals in cigarettes and cigars can damage your bones and joints  Ask your healthcare provider for information if you currently smoke and need help to quit  E-cigarettes or smokeless tobacco still contain nicotine  Talk to your healthcare provider before you use these products  Support devices:   Orthotic shoes or insoles  help support your feet when you walk  Crutches, a cane, or a walker  may help decrease your risk for falling  They also decrease stress on affected joints  Devices to prevent falls  include raised toilet seats and bathtub bars to help you get up from sitting   Handrails can be placed in areas where you need balance and support  Devices to help with support and rest  include splints to wear on your hands and a firm pillow while you sleep  Use a pillow that is firm enough to support your neck and head  Follow up with your healthcare provider or rheumatologist as directed:  Write down your questions so you remember to ask them during your visits  © Copyright [employer] 2022 Information is for End User's use only and may not be sold, redistributed or otherwise used for commercial purposes  All illustrations and images included in CareNotes® are the copyrighted property of [employer]  or [facility] Willian Macdonald   The above information is an  only  It is not intended as medical advice for individual conditions or treatments  Talk to your doctor, nurse or pharmacist before following any medical regimen to see if it is safe and effective for you

## 2022-09-06 ENCOUNTER — HOSPITAL ENCOUNTER (OUTPATIENT)
Dept: RADIOLOGY | Facility: MEDICAL CENTER | Age: 57
Discharge: HOME | End: 2022-09-06
Payer: COMMERCIAL

## 2022-09-06 VITALS
HEART RATE: 88 BPM | RESPIRATION RATE: 20 BRPM | DIASTOLIC BLOOD PRESSURE: 99 MMHG | SYSTOLIC BLOOD PRESSURE: 147 MMHG | TEMPERATURE: 97.8 F | OXYGEN SATURATION: 94 %

## 2022-09-06 DIAGNOSIS — M16.10 HIP ARTHRITIS: ICD-10-CM

## 2022-09-06 PROCEDURE — 20610 DRAIN/INJ JOINT/BURSA W/O US: CPT | Performed by: PHYSICAL MEDICINE & REHABILITATION

## 2022-09-06 PROCEDURE — 77002 NEEDLE LOCALIZATION BY XRAY: CPT

## 2022-09-06 PROCEDURE — 77002 NEEDLE LOCALIZATION BY XRAY: CPT | Performed by: PHYSICAL MEDICINE & REHABILITATION

## 2022-09-06 RX ORDER — BUPIVACAINE HCL/PF 2.5 MG/ML
3 VIAL (ML) INJECTION ONCE
Status: COMPLETED | OUTPATIENT
Start: 2022-09-06 | End: 2022-09-06

## 2022-09-06 RX ORDER — METHYLPREDNISOLONE ACETATE 40 MG/ML
40 INJECTION, SUSPENSION INTRA-ARTICULAR; INTRALESIONAL; INTRAMUSCULAR; PARENTERAL; SOFT TISSUE ONCE
Status: COMPLETED | OUTPATIENT
Start: 2022-09-06 | End: 2022-09-06

## 2022-09-06 RX ADMIN — METHYLPREDNISOLONE ACETATE 40 MG: 40 INJECTION, SUSPENSION INTRA-ARTICULAR; INTRALESIONAL; INTRAMUSCULAR; PARENTERAL; SOFT TISSUE at 09:44

## 2022-09-06 RX ADMIN — IOHEXOL 2 ML: 300 INJECTION, SOLUTION INTRAVENOUS at 09:44

## 2022-09-06 RX ADMIN — Medication 3 ML: at 09:44

## 2022-09-06 NOTE — DISCHARGE INSTRUCTIONS
Steroid Joint Injection   WHAT YOU NEED TO KNOW:   A steroid joint injection is a procedure to inject steroid medicine into a joint  Steroid medicine decreases pain and inflammation  The injection may also contain an anesthetic (numbing medicine) to decrease pain  It may be done to treat conditions such as arthritis, gout, or carpal tunnel syndrome  The injections may be given in your knee, ankle, shoulder, elbow, wrist, ankle or sacroiliac joint  Do not apply heat to any area that is numb  If you have discomfort or soreness at the injection site, you may apply ice today, 20 minutes on and 20 minutes off  Tomorrow you may use ice or warm, moist heat  Do not apply ice or heat directly to the skin  You may have an increase or change in the discomfort for 36-48 hours after your treatment  Apply ice and continue with any pain medicine you have been prescribed  Do not do anything strenuous today  You may shower, but no tub baths or hot tubs today  You may resume your normal activities tomorrow, but do not overdo it  Resume normal activities slowly when you are feeling better  If you experience redness, drainage or swelling at the injection site, or if you develop a fever above 100 degrees, please call [facility] at [phone] or go to the Emergency Room  Continue to take all routine medicines prescribed by your primary care physician unless otherwise instructed by our staff  Most blood thinners should be started again according to your regularly scheduled dosing  If you have any questions, please give our office a call  As no general anesthesia was used in today's procedure, you should not experience any side effects related to anesthesia  If you are diabetic, the steroids used in today's injection may temporarily increase your blood sugar levels after the first few days after your injection   Please keep a close eye on your sugars and alert the doctor who manages your diabetes if your sugars are significantly high from your baseline or you are symptomatic  If you have a problem specifically related to your procedure, please call our office at [phone]  Problems not related to your procedure should be directed to your primary care physician

## 2022-09-06 NOTE — H&P
History of Present Illness: The patient is a 55 y o  male who presents with complaints of Right hip pain    Patient Active Problem List   Diagnosis    Varicose veins of both lower extremities with pain    Chronic eczema of foot    Mixed hyperlipidemia       Past Medical History:   Diagnosis Date    Asymptomatic varicose veins of bilateral lower extremities     Chronic pain of right hip     Varicose veins of legs 2019    bilateral       Past Surgical History:   Procedure Laterality Date    MULTIPLE TOOTH EXTRACTIONS Bilateral 2008       No current outpatient medications on file  No Known Allergies    Physical Exam:   Vitals:    09/06/22 0924   BP: 140/97   Pulse: 84   Resp: 20   Temp: 97 8 °F (36 6 °C)   SpO2: 95%     General: Awake, Alert, Oriented x 3, Mood and affect appropriate  Respiratory: Respirations even and unlabored  Cardiovascular: Peripheral pulses intact; no edema  Musculoskeletal Exam:  Right hip pain    ASA Score:  2  Patient/Chart Verification  Patient ID Verified: Verbal  ID Band Applied: No  Consents Confirmed: Procedural, To be obtained in the Pre-Procedure area  H&P( within 30 days) Verified: To be obtained in the Pre-Procedure area  Interval H&P(within 24 hr) Complete (required for Outpatients and Surgery Admit only): To be obtained in the Pre-Procedure area  Allergies Reviewed: Yes  Anticoag/NSAID held?: NA  Currently on antibiotics?: No    Assessment:   1   Hip arthritis        Plan: (R) hip joint injection

## 2022-09-13 ENCOUNTER — TELEPHONE (OUTPATIENT)
Dept: PAIN MEDICINE | Facility: CLINIC | Age: 57
End: 2022-09-13

## 2022-09-26 ENCOUNTER — EVALUATION (OUTPATIENT)
Dept: PHYSICAL THERAPY | Facility: CLINIC | Age: 57
End: 2022-09-26
Payer: COMMERCIAL

## 2022-09-26 DIAGNOSIS — M16.11 PRIMARY OSTEOARTHRITIS OF RIGHT HIP: ICD-10-CM

## 2022-09-26 DIAGNOSIS — M25.551 RIGHT HIP PAIN: ICD-10-CM

## 2022-09-26 PROCEDURE — 97161 PT EVAL LOW COMPLEX 20 MIN: CPT

## 2022-09-26 PROCEDURE — 97110 THERAPEUTIC EXERCISES: CPT

## 2022-09-26 NOTE — PROGRESS NOTES
PT Evaluation     Today's date: 2022  Patient name: Riana Hopkins  : 1965  MRN: [id number]  Referring provider: MARK Reyna*  Dx:   Encounter Diagnosis     ICD-10-CM    1  Primary osteoarthritis of right hip  M16 11 Ambulatory Referral to Physical Therapy   2  Right hip pain  M25 551 Ambulatory Referral to Physical Therapy                  Assessment  Assessment details: Riana Hopkins is a pleasant 65year old male who presents with chronic right hip pain beginning in 2022  The patient's greatest concerns are improving motion in his hip  They have a primary movement diagnosis of right hip hypomobility resulting in reduced hip AROM limiting his ability to don/doff shoes and socks  These signs and symptoms are consistent with primary osteoarthritis of right hip and right hip pain  His primary impairments are right hip ROM and strength and will be addressed with an independent HEP consisting of therapeutic exercise and activities  He will benefit from skilled PT to follow up with independence with HEP and improve ability to complete work tasks and walk with less pain/difficulty  At this time no referral is necessary based on examination  Impairments: abnormal or restricted ROM, activity intolerance, impaired balance, impaired physical strength, lacks appropriate home exercise program, pain with function and poor body mechanics  Prognosis details: Positive prognostic indicators include positive attitude toward recovery, motivated to improve, high self-efficacy, good understanding of condition, realistic expectations  Negative prognostic indicators include chronicity of symptoms  Goals  STG to be met in 4 weeks  -Patient will be independent with HEP  Plan  Plan details:  Follow up on HEP in 2 weeks; schedule additional visits if deemed necessary     Patient would benefit from: skilled physical therapy  Planned modality interventions: cryotherapy and thermotherapy: hydrocollator packs  Planned therapy interventions: activity modification, joint mobilization, manual therapy, balance, neuromuscular re-education, body mechanics training, patient education, postural training, strengthening, stretching, therapeutic activities, therapeutic exercise, home exercise program, gait training, flexibility and functional ROM exercises  Plan of Care beginning date: 2022  Plan of Care expiration date: 10/24/2022  Treatment plan discussed with: patient        Subjective Evaluation    History of Present Illness  Date of onset: 2022  Mechanism of injury: Patient reports right hip began to mildly start bothering him about 8 months ago  Then pain started to bother him every day  He had cortisone injection the day after labor day which he notes helped  He has not had any pain since but he feels like something is out of place  Difficulty with putting socks/shoes on and bending down  Reports he sometimes has pain when walking long distances  Notes sometimes having pain with colder temperatures  Wears compression socks for varicose veins  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  Patient reports physician mentioned a hip replacement within the next year which patient does not want to have  Pain  Current pain ratin  At best pain ratin  At worst pain ratin      Diagnostic Tests  Abnormal x-ray: arthritis in right hip  Treatments  Current treatment: injection treatment  Patient Goals  Patient goal: Patient would like to increase flexibility          Objective     Active Range of Motion   Left Hip   Flexion: 95 degrees   Abduction: 25 degrees   External rotation (90/90): 35 degrees   Internal rotation (90/90): 20 degrees     Right Hip   Flexion: 96 degrees   Abduction: 20 degrees   External rotation (90/90): 28 degrees   Internal rotation (90/90): 10 degrees     Strength/Myotome Testing     Left Hip   Planes of Motion   Flexion: 4  Extension: 4  Abduction: 3+  External rotation: 4-    Right Hip   Planes of Motion   Flexion: 4  Extension: 4  Abduction: 3+  External rotation: 4-    Left Knee   Flexion: 4  Extension: 5    Right Knee   Flexion: 4  Extension: 4    Left Ankle/Foot   Dorsiflexion: 5  Inversion: 5  Eversion: 5    Right Ankle/Foot   Dorsiflexion: 5  Inversion: 5  Eversion: 5    Tests     Left Hip   Negative scour  Right Hip   Negative scour                Precautions: varicose veins in BLE  Functional Limitations: donning/doffing socks/shoes, prolonged walking  Impairments: right hip ROM and strength  [employer] Code: [id number]         Manuals 9/26                                                                Neuro Re-Ed                                                                                                        Ther Ex             bike             Prone hip IR HEP            Supine SKTC HEP            Supine figure 4 HEP            Supine SLR HEP            sidelying hip abd HEP            Prone hip ext HEP                         Ther Activity                                       Gait Training                                       Modalities

## 2022-10-03 ENCOUNTER — OFFICE VISIT (OUTPATIENT)
Dept: PAIN MEDICINE | Facility: MEDICAL CENTER | Age: 57
End: 2022-10-03
Payer: COMMERCIAL

## 2022-10-03 VITALS
TEMPERATURE: 97.9 F | WEIGHT: 248 LBS | BODY MASS INDEX: 34.72 KG/M2 | HEIGHT: 71 IN | HEART RATE: 76 BPM | SYSTOLIC BLOOD PRESSURE: 132 MMHG | DIASTOLIC BLOOD PRESSURE: 95 MMHG

## 2022-10-03 DIAGNOSIS — M16.10 HIP ARTHRITIS: Primary | ICD-10-CM

## 2022-10-03 DIAGNOSIS — M25.551 PAIN IN JOINT OF RIGHT HIP: ICD-10-CM

## 2022-10-03 PROCEDURE — 99214 OFFICE O/P EST MOD 30 MIN: CPT | Performed by: PHYSICIAN ASSISTANT

## 2022-10-03 RX ORDER — IBUPROFEN 800 MG/1
800 TABLET ORAL 3 TIMES DAILY PRN
Qty: 90 TABLET | Refills: 0 | Status: SHIPPED | OUTPATIENT
Start: 2022-10-03 | End: 2022-11-02

## 2022-10-03 NOTE — PROGRESS NOTES
Assessment:  1  Hip arthritis    2  Pain in joint of right hip        Plan:  While the patient was in the office today, I did have a thorough conversation regarding their chronic pain syndrome, medication management, and treatment plan options  The patient is status post right intra-articular hip injection that was performed on September 6, 2022 and he reports near complete resolution of hip pain  He describe some mild tightness and limited range of motion but states that it is not something he would describe as pain  He does find a correlation between cold and damp weather changes in his pain/stiffness  He had a physical therapy consultation and was provided with home stretching exercises to perform  At this point since he has no pain, we will not schedule any further appointments at our office  I did prescribe him ibuprofen 800 mg t i d  p r n  with food if his pain was started increased  He was advised to call and follow up with our office if the pain returns at which point we could consider a repeat injection  My impressions and treatment recommendations were discussed in detail with the patient who verbalized understanding and had no further questions  Discharge instructions were provided  I personally saw and examined the patient and I agree with the above discussed plan of care  No orders of the defined types were placed in this encounter  New Medications Ordered This Visit   Medications    ibuprofen (MOTRIN) 800 mg tablet     Sig: Take 1 tablet (800 mg total) by mouth 3 (three) times a day as needed for mild pain     Dispense:  90 tablet     Refill:  0       History of Present Illness:  Gurdeep Wetzel is a 65 y o  male who presents for a follow up office visit in regards to Hip Pain  The patients current symptoms include chronic right hip pain rated as a 0/10 on pain scale today    He is status post right intra-articular hip injection done on September 6, 2022 with resolution of pain   He reports mild stiffness and tightness that seems to be worse with prolonged positions  He recently had a physical therapy consultation and will be performing home stretching exercises  Otherwise he has no new symptoms or acute issues on today's visit and he is pleased with the result of the injection  I personally reviewed and/or updated the patient's past medical history, past surgical history, family history, social history, current medications, allergies, and vital signs today  Review of Systems   Respiratory: Negative for shortness of breath  Cardiovascular: Negative for chest pain  Gastrointestinal: Negative for constipation, diarrhea, nausea and vomiting  Musculoskeletal: Positive for joint swelling and myalgias  Negative for arthralgias and gait problem  Skin: Negative for rash  Neurological: Negative for dizziness, seizures and weakness  All other systems reviewed and are negative  Patient Active Problem List   Diagnosis    Varicose veins of both lower extremities with pain    Chronic eczema of foot    Mixed hyperlipidemia    Hip arthritis       Past Medical History:   Diagnosis Date    Asymptomatic varicose veins of bilateral lower extremities     Chronic pain of right hip     Varicose veins of legs 2019    bilateral       Past Surgical History:   Procedure Laterality Date    MULTIPLE TOOTH EXTRACTIONS Bilateral 2008       Family History   Problem Relation Age of Onset    Aneurysm Mother     Heart disease Father 76        Had 11 coronary bypasses    Stroke Father     Kidney disease Paternal Grandfather 76        Had dialysis also       Social History     Occupational History    Not on file   Tobacco Use    Smoking status: Never Smoker    Smokeless tobacco: Never Used   Vaping Use    Vaping Use: Never used   Substance and Sexual Activity    Alcohol use:  Yes     Alcohol/week: 1 0 standard drink     Types: 1 Glasses of wine per week     Comment: Less than one glass of wine per week; sometimes pt goes weeks without drinking   Drug use: Never    Sexual activity: Yes     Partners: Female       No current outpatient medications on file prior to visit  No current facility-administered medications on file prior to visit  No Known Allergies    Physical Exam:    /95   Pulse 76   Temp 97 9 °F (36 6 °C)   Ht 5' 11" (1 803 m)   Wt 112 kg (248 lb)   BMI 34 59 kg/m²     Constitutional:normal, well developed, well nourished, alert, in no distress and non-toxic and no overt pain behavior    Eyes:anicteric  HEENT:grossly intact  Neck:supple, symmetric, trachea midline and no masses   Pulmonary:even and unlabored  Cardiovascular:No edema or pitting edema present  Skin:Normal without rashes or lesions and well hydrated  Psychiatric:Mood and affect appropriate  Neurologic:Cranial Nerves II-XII grossly intact  Musculoskeletal:normal    Imaging

## 2022-10-10 ENCOUNTER — OFFICE VISIT (OUTPATIENT)
Dept: PHYSICAL THERAPY | Facility: CLINIC | Age: 57
End: 2022-10-10
Payer: COMMERCIAL

## 2022-10-10 DIAGNOSIS — M25.551 RIGHT HIP PAIN: ICD-10-CM

## 2022-10-10 DIAGNOSIS — M16.11 PRIMARY OSTEOARTHRITIS OF RIGHT HIP: Primary | ICD-10-CM

## 2022-10-10 PROCEDURE — 97530 THERAPEUTIC ACTIVITIES: CPT

## 2022-10-10 PROCEDURE — 97110 THERAPEUTIC EXERCISES: CPT

## 2022-10-10 NOTE — PROGRESS NOTES
Daily Note     Today's date: 10/10/2022  Patient name: Doug Ruiz  : 1965  MRN: [id number]  Referring provider: MARK Friedman*  Dx:   Encounter Diagnosis     ICD-10-CM    1  Primary osteoarthritis of right hip  M16 11    2  Right hip pain  M25 551                   Subjective: Patient reports having no pain with HEP  Notes straight leg raise is challenging but not painful      Objective: See treatment diary below      Assessment: Updated HEP with new exercises that he tolerated well today during appointment  Educated him on HEP  He demonstrated understanding  Will be discharged to independent [facility] at this time        Plan: Discharge to independent HEP     Precautions: varicose veins in BLE  Functional Limitations: donning/doffing socks/shoes, prolonged walking  Impairments: right hip ROM and strength  [facility] Code: [id number]         Manuals 9/26 10/10                                                               Neuro Re-Ed                                                                                                        Ther Ex             bike  5'           Prone hip IR HEP 5"x10           Supine SKTC HEP 30"x3           Supine figure 4 HEP 30"x3           Supine SLR HEP 20           sidelying hip abd HEP 20           Prone hip ext HEP 20           Supine hip flexor stretch  30"x3           bridge  10           Half kneel hip flexor stretch  3x30"           Ther Activity             Fwd lunge  15 ea           Lateral lunge  15 ea           Gait Training                                       Modalities

## 2022-10-20 ENCOUNTER — TELEPHONE (OUTPATIENT)
Dept: FAMILY MEDICINE CLINIC | Facility: CLINIC | Age: 57
End: 2022-10-20

## 2023-06-06 ENCOUNTER — TELEPHONE (OUTPATIENT)
Dept: FAMILY MEDICINE CLINIC | Facility: CLINIC | Age: 58
End: 2023-06-06

## 2023-06-06 NOTE — TELEPHONE ENCOUNTER
Appointment -    I left a VM advising pt to call the office to schedule his annual physical, due after 7/15/2023

## 2023-06-09 NOTE — TELEPHONE ENCOUNTER
I left a VM advising pt to call the office to schedule his annual physical     Patient reminder card mailed to home